# Patient Record
Sex: FEMALE | Race: WHITE
[De-identification: names, ages, dates, MRNs, and addresses within clinical notes are randomized per-mention and may not be internally consistent; named-entity substitution may affect disease eponyms.]

---

## 2018-10-26 ENCOUNTER — HOSPITAL ENCOUNTER (EMERGENCY)
Dept: HOSPITAL 15 - ER | Age: 68
Discharge: HOME | End: 2018-10-26
Payer: COMMERCIAL

## 2018-10-26 VITALS — HEIGHT: 66 IN | BODY MASS INDEX: 36.16 KG/M2 | WEIGHT: 225 LBS

## 2018-10-26 VITALS — SYSTOLIC BLOOD PRESSURE: 141 MMHG | DIASTOLIC BLOOD PRESSURE: 78 MMHG
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
## 2018-10-26 DIAGNOSIS — S20.211A: Primary | ICD-10-CM
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
## 2018-10-26 PROCEDURE — 71101 X-RAY EXAM UNILAT RIBS/CHEST: CPT

## 2019-03-20 ENCOUNTER — HOSPITAL ENCOUNTER (INPATIENT)
Dept: HOSPITAL 15 - ER | Age: 69
LOS: 2 days | Discharge: HOME | DRG: 391 | End: 2019-03-22
Attending: INTERNAL MEDICINE | Admitting: NURSE PRACTITIONER
Payer: COMMERCIAL

## 2019-03-20 VITALS — BODY MASS INDEX: 35.33 KG/M2 | HEIGHT: 67 IN | WEIGHT: 225.09 LBS

## 2019-03-20 DIAGNOSIS — E87.2: ICD-10-CM

## 2019-03-20 DIAGNOSIS — E11.65: ICD-10-CM

## 2019-03-20 DIAGNOSIS — Z90.710: ICD-10-CM

## 2019-03-20 DIAGNOSIS — K29.80: Primary | ICD-10-CM

## 2019-03-20 DIAGNOSIS — Z90.49: ICD-10-CM

## 2019-03-20 DIAGNOSIS — I11.9: ICD-10-CM

## 2019-03-20 DIAGNOSIS — K57.30: ICD-10-CM

## 2019-03-20 DIAGNOSIS — K85.90: ICD-10-CM

## 2019-03-20 DIAGNOSIS — R91.1: ICD-10-CM

## 2019-03-20 DIAGNOSIS — N39.0: ICD-10-CM

## 2019-03-20 LAB
ALBUMIN SERPL-MCNC: 3.3 G/DL (ref 3.4–5)
ALP SERPL-CCNC: 73 U/L (ref 45–117)
ALT SERPL-CCNC: 24 U/L (ref 13–56)
ANION GAP SERPL CALCULATED.3IONS-SCNC: 10 MMOL/L (ref 5–15)
BILIRUB SERPL-MCNC: 0.4 MG/DL (ref 0.2–1)
BUN SERPL-MCNC: 21 MG/DL (ref 7–18)
BUN/CREAT SERPL: 18.6
CALCIUM SERPL-MCNC: 8.5 MG/DL (ref 8.5–10.1)
CHLORIDE SERPL-SCNC: 111 MMOL/L (ref 98–107)
CO2 SERPL-SCNC: 18 MMOL/L (ref 21–32)
GLUCOSE SERPL-MCNC: 232 MG/DL (ref 74–106)
HCT VFR BLD AUTO: 42.7 % (ref 36–46)
HGB BLD-MCNC: 14.5 G/DL (ref 12.2–16.2)
MAGNESIUM SERPL-MCNC: 1.7 MG/DL (ref 1.6–2.6)
MCH RBC QN AUTO: 30.1 PG (ref 28–32)
MCV RBC AUTO: 88.8 FL (ref 80–100)
NRBC BLD QL AUTO: 0 %
POTASSIUM SERPL-SCNC: 3.8 MMOL/L (ref 3.5–5.1)
PROT SERPL-MCNC: 7.8 G/DL (ref 6.4–8.2)
SODIUM SERPL-SCNC: 139 MMOL/L (ref 136–145)

## 2019-03-20 PROCEDURE — 82150 ASSAY OF AMYLASE: CPT

## 2019-03-20 PROCEDURE — 83690 ASSAY OF LIPASE: CPT

## 2019-03-20 PROCEDURE — 74176 CT ABD & PELVIS W/O CONTRAST: CPT

## 2019-03-20 PROCEDURE — 87086 URINE CULTURE/COLONY COUNT: CPT

## 2019-03-20 PROCEDURE — 80061 LIPID PANEL: CPT

## 2019-03-20 PROCEDURE — 82962 GLUCOSE BLOOD TEST: CPT

## 2019-03-20 PROCEDURE — 80053 COMPREHEN METABOLIC PANEL: CPT

## 2019-03-20 PROCEDURE — 71260 CT THORAX DX C+: CPT

## 2019-03-20 PROCEDURE — 93005 ELECTROCARDIOGRAM TRACING: CPT

## 2019-03-20 PROCEDURE — 36415 COLL VENOUS BLD VENIPUNCTURE: CPT

## 2019-03-20 PROCEDURE — 96366 THER/PROPH/DIAG IV INF ADDON: CPT

## 2019-03-20 PROCEDURE — 84484 ASSAY OF TROPONIN QUANT: CPT

## 2019-03-20 PROCEDURE — 80048 BASIC METABOLIC PNL TOTAL CA: CPT

## 2019-03-20 PROCEDURE — 83735 ASSAY OF MAGNESIUM: CPT

## 2019-03-20 PROCEDURE — 74177 CT ABD & PELVIS W/CONTRAST: CPT

## 2019-03-20 PROCEDURE — 78582 LUNG VENTILAT&PERFUS IMAGING: CPT

## 2019-03-20 PROCEDURE — 96365 THER/PROPH/DIAG IV INF INIT: CPT

## 2019-03-20 PROCEDURE — 71045 X-RAY EXAM CHEST 1 VIEW: CPT

## 2019-03-20 PROCEDURE — 85025 COMPLETE CBC W/AUTO DIFF WBC: CPT

## 2019-03-20 PROCEDURE — 96375 TX/PRO/DX INJ NEW DRUG ADDON: CPT

## 2019-03-20 PROCEDURE — 85379 FIBRIN DEGRADATION QUANT: CPT

## 2019-03-20 PROCEDURE — 83036 HEMOGLOBIN GLYCOSYLATED A1C: CPT

## 2019-03-20 PROCEDURE — 83880 ASSAY OF NATRIURETIC PEPTIDE: CPT

## 2019-03-20 PROCEDURE — 81001 URINALYSIS AUTO W/SCOPE: CPT

## 2019-03-20 RX ADMIN — HUMAN INSULIN SCH UNITS: 100 INJECTION, SOLUTION SUBCUTANEOUS at 23:42

## 2019-03-20 RX ADMIN — ATORVASTATIN CALCIUM SCH MG: 20 TABLET, FILM COATED ORAL at 23:26

## 2019-03-20 RX ADMIN — FAMOTIDINE SCH MG: 20 TABLET, FILM COATED ORAL at 23:26

## 2019-03-20 RX ADMIN — Medication SCH STRIP: at 23:42

## 2019-03-21 VITALS — DIASTOLIC BLOOD PRESSURE: 58 MMHG | SYSTOLIC BLOOD PRESSURE: 126 MMHG

## 2019-03-21 VITALS — DIASTOLIC BLOOD PRESSURE: 70 MMHG | SYSTOLIC BLOOD PRESSURE: 134 MMHG

## 2019-03-21 LAB
AMYLASE SERPL-CCNC: 22 U/L (ref 25–115)
ANION GAP SERPL CALCULATED.3IONS-SCNC: 9 MMOL/L (ref 5–15)
BUN SERPL-MCNC: 24 MG/DL (ref 7–18)
BUN/CREAT SERPL: 20.9
CALCIUM SERPL-MCNC: 8.2 MG/DL (ref 8.5–10.1)
CHLORIDE SERPL-SCNC: 114 MMOL/L (ref 98–107)
CHOLEST SERPL-MCNC: 142 MG/DL (ref ?–200)
CO2 SERPL-SCNC: 19 MMOL/L (ref 21–32)
GLUCOSE SERPL-MCNC: 152 MG/DL (ref 74–106)
HCT VFR BLD AUTO: 39.8 % (ref 36–46)
HDLC SERPL-MCNC: 39 MG/DL (ref 40–59)
HGB BLD-MCNC: 13.3 G/DL (ref 12.2–16.2)
LIPASE SERPL-CCNC: 167 U/L (ref 73–393)
MCH RBC QN AUTO: 29.7 PG (ref 28–32)
MCV RBC AUTO: 89 FL (ref 80–100)
NRBC BLD QL AUTO: 0 %
POTASSIUM SERPL-SCNC: 3.9 MMOL/L (ref 3.5–5.1)
SODIUM SERPL-SCNC: 142 MMOL/L (ref 136–145)
TRIGL SERPL-MCNC: 138 MG/DL (ref ?–150)

## 2019-03-21 RX ADMIN — ONDANSETRON HYDROCHLORIDE PRN MG: 2 INJECTION, SOLUTION INTRAMUSCULAR; INTRAVENOUS at 03:39

## 2019-03-21 RX ADMIN — HUMAN INSULIN SCH UNITS: 100 INJECTION, SOLUTION SUBCUTANEOUS at 11:36

## 2019-03-21 RX ADMIN — Medication SCH STRIP: at 11:36

## 2019-03-21 RX ADMIN — HUMAN INSULIN SCH UNITS: 100 INJECTION, SOLUTION SUBCUTANEOUS at 05:49

## 2019-03-21 RX ADMIN — PANTOPRAZOLE SODIUM SCH MG: 40 TABLET, DELAYED RELEASE ORAL at 10:04

## 2019-03-21 RX ADMIN — ONDANSETRON HYDROCHLORIDE PRN MG: 2 INJECTION, SOLUTION INTRAMUSCULAR; INTRAVENOUS at 11:15

## 2019-03-21 RX ADMIN — FAMOTIDINE SCH MG: 20 TABLET, FILM COATED ORAL at 22:09

## 2019-03-21 RX ADMIN — SUCRALFATE SCH GM: 1 SUSPENSION ORAL at 22:09

## 2019-03-21 RX ADMIN — HUMAN INSULIN SCH UNITS: 100 INJECTION, SOLUTION SUBCUTANEOUS at 18:00

## 2019-03-21 RX ADMIN — Medication SCH STRIP: at 05:49

## 2019-03-21 RX ADMIN — FAMOTIDINE SCH MG: 20 TABLET, FILM COATED ORAL at 10:04

## 2019-03-21 RX ADMIN — ATORVASTATIN CALCIUM SCH MG: 20 TABLET, FILM COATED ORAL at 22:09

## 2019-03-21 RX ADMIN — CEFTRIAXONE SODIUM SCH MLS/HR: 1 INJECTION, POWDER, FOR SOLUTION INTRAMUSCULAR; INTRAVENOUS at 10:40

## 2019-03-21 RX ADMIN — SODIUM CHLORIDE SCH MLS/HR: 0.9 INJECTION, SOLUTION INTRAVENOUS at 13:19

## 2019-03-21 RX ADMIN — Medication SCH STRIP: at 18:04

## 2019-03-21 RX ADMIN — LOSARTAN POTASSIUM SCH MG: 50 TABLET, FILM COATED ORAL at 10:05

## 2019-03-21 RX ADMIN — SUCRALFATE SCH GM: 1 SUSPENSION ORAL at 18:44

## 2019-03-21 RX ADMIN — SODIUM CHLORIDE SCH MLS/HR: 0.9 INJECTION, SOLUTION INTRAVENOUS at 19:45

## 2019-03-21 NOTE — NUR
Telemetry admit from ER

PINKYMARY admitted to Telemetry unit after SBAR received.  Patient oriented to Laura Singer, RN primary RN, [facility] unit, room 250 , bed b, and unit policies regarding 
patient care and visiting hours. Patient now on continuous telemetry monitoring, tele box 
#17  and telemetry reading on arrival to unit is SB. Patient weighed by bedscale and 
encouraged to call if they need something. All questions and concerns addressed, patient 
verbalized understanding. 

Note:

## 2019-03-21 NOTE — NUR
open note

assumed care of pt. upon entering room, pt awake and alert. updated pt on plan of care. pt 
denied any pain, no distress noted or expressed. pt call light in reach. will round q1hr and 
as needed. no additional questions at this time.

## 2019-03-21 NOTE — NUR
Dr Cedeno at bedside. New orders received for NPO diet and IV NS at 125ml/hr. Will 
continue to monitor.

## 2019-03-22 VITALS — DIASTOLIC BLOOD PRESSURE: 67 MMHG | SYSTOLIC BLOOD PRESSURE: 114 MMHG

## 2019-03-22 VITALS — SYSTOLIC BLOOD PRESSURE: 124 MMHG | DIASTOLIC BLOOD PRESSURE: 74 MMHG

## 2019-03-22 VITALS — DIASTOLIC BLOOD PRESSURE: 66 MMHG | SYSTOLIC BLOOD PRESSURE: 132 MMHG

## 2019-03-22 LAB
ANION GAP SERPL CALCULATED.3IONS-SCNC: 8 MMOL/L (ref 5–15)
BUN SERPL-MCNC: 17 MG/DL (ref 7–18)
BUN/CREAT SERPL: 14.2
CALCIUM SERPL-MCNC: 7.8 MG/DL (ref 8.5–10.1)
CHLORIDE SERPL-SCNC: 113 MMOL/L (ref 98–107)
CO2 SERPL-SCNC: 19 MMOL/L (ref 21–32)
GLUCOSE SERPL-MCNC: 186 MG/DL (ref 74–106)
HCT VFR BLD AUTO: 42.5 % (ref 36–46)
HGB BLD-MCNC: 14.1 G/DL (ref 12.2–16.2)
MCH RBC QN AUTO: 29.7 PG (ref 28–32)
MCV RBC AUTO: 89.3 FL (ref 80–100)
NRBC BLD QL AUTO: 0 %
POTASSIUM SERPL-SCNC: 3.6 MMOL/L (ref 3.5–5.1)
SODIUM SERPL-SCNC: 140 MMOL/L (ref 136–145)

## 2019-03-22 RX ADMIN — Medication SCH STRIP: at 11:46

## 2019-03-22 RX ADMIN — HUMAN INSULIN SCH UNITS: 100 INJECTION, SOLUTION SUBCUTANEOUS at 11:52

## 2019-03-22 RX ADMIN — FAMOTIDINE SCH MG: 20 TABLET, FILM COATED ORAL at 09:35

## 2019-03-22 RX ADMIN — SUCRALFATE SCH GM: 1 SUSPENSION ORAL at 06:51

## 2019-03-22 RX ADMIN — SUCRALFATE SCH GM: 1 SUSPENSION ORAL at 11:30

## 2019-03-22 RX ADMIN — SODIUM CHLORIDE SCH MLS/HR: 0.9 INJECTION, SOLUTION INTRAVENOUS at 03:45

## 2019-03-22 RX ADMIN — PANTOPRAZOLE SODIUM SCH MG: 40 TABLET, DELAYED RELEASE ORAL at 09:35

## 2019-03-22 RX ADMIN — HUMAN INSULIN SCH UNITS: 100 INJECTION, SOLUTION SUBCUTANEOUS at 06:30

## 2019-03-22 RX ADMIN — HUMAN INSULIN SCH UNITS: 100 INJECTION, SOLUTION SUBCUTANEOUS at 00:30

## 2019-03-22 RX ADMIN — LOSARTAN POTASSIUM SCH MG: 50 TABLET, FILM COATED ORAL at 09:36

## 2019-03-22 RX ADMIN — CEFTRIAXONE SODIUM SCH MLS/HR: 1 INJECTION, POWDER, FOR SOLUTION INTRAMUSCULAR; INTRAVENOUS at 09:34

## 2019-03-22 RX ADMIN — Medication SCH STRIP: at 06:30

## 2019-03-22 RX ADMIN — Medication SCH STRIP: at 00:30

## 2019-03-22 NOTE — NUR
Dr Cedeno made aware that patient is refusing tests and would like to do EGD and stress 
test  outpatient since, patient's  is actively dying.

## 2019-03-22 NOTE — NUR
Dr Lopez at bedside and made aware that patient refused stress test and wants to have it 
done as outpatient.

## 2019-03-22 NOTE — NUR
pt stated "i dont wanna continue to do tests here if it means im going to stay longer. i 
want to go to [facility]", this topic was brought up when this nurse made was to start new 
IV for cardiolite stress test scheduled to go forward this morning 03/22/19. this nurse did 
not start IV and will endorse to day shift RN, that pt would like to speak to the 
hospitalist later today to discuss options regarding her desired destination, [facility] [place] 
"thats where all my doctors are and thats where they can all have access to all my stuff and 
im comfortable there". no distress noted or expressed. call light in reach. will round q1hr 
and prn.

## 2019-03-22 NOTE — NUR
Discharge instructions given as ordered. Encourage to follow up with PMD, Cardio, and GI as 
instructed. Patient stated spoke with primary MD and has appt set up for Monday already. All 
questions and concerns addressed. Patient verbalized understanding.  Medication 
reconciliation form completed and copy given to patient. IV removed with catheter intact, 
pressure dressing applied.  Telemetry unit returned to [facility]. Patient refused to be taken to 
vehicle via wheelchair with all personal belongings. No distress noted at time of departure.

## 2019-03-22 NOTE — NUR
Paged Dr Otoole spoke with Matilda and left message to inform Dr Otoole patient refusing to 
have EGD done and want to have it done as outpatient. Will continue to monitor.

## 2019-03-22 NOTE — NUR
Went in to speak to patient regarding procedures scheduled for today, stress test and EGD. 
Patient refusing to have these procedures done today. Patient wants to have these procedures 
done as outpatient. Patient stated that she is feeling better and wants to go home today and 
she will follow up with her primary MD and have referrals to follow up with GI and her own 
cardiologist at [place]. Will inform Dr Cedeno and Dr Otoole.